# Patient Record
Sex: MALE | Race: WHITE | NOT HISPANIC OR LATINO | Employment: FULL TIME | URBAN - METROPOLITAN AREA
[De-identification: names, ages, dates, MRNs, and addresses within clinical notes are randomized per-mention and may not be internally consistent; named-entity substitution may affect disease eponyms.]

---

## 2018-07-09 ENCOUNTER — APPOINTMENT (OUTPATIENT)
Dept: RADIOLOGY | Facility: MEDICAL CENTER | Age: 58
End: 2018-07-09
Payer: OTHER MISCELLANEOUS

## 2018-07-09 ENCOUNTER — HOSPITAL ENCOUNTER (EMERGENCY)
Facility: MEDICAL CENTER | Age: 58
End: 2018-07-09
Attending: EMERGENCY MEDICINE
Payer: OTHER MISCELLANEOUS

## 2018-07-09 VITALS
HEART RATE: 76 BPM | SYSTOLIC BLOOD PRESSURE: 136 MMHG | BODY MASS INDEX: 32.14 KG/M2 | TEMPERATURE: 98.6 F | OXYGEN SATURATION: 97 % | RESPIRATION RATE: 20 BRPM | HEIGHT: 66 IN | DIASTOLIC BLOOD PRESSURE: 78 MMHG | WEIGHT: 200 LBS

## 2018-07-09 DIAGNOSIS — R20.2 LEFT LEG PARESTHESIAS: ICD-10-CM

## 2018-07-09 DIAGNOSIS — R79.89 ELEVATED SERUM CREATININE: ICD-10-CM

## 2018-07-09 DIAGNOSIS — R79.89 ELEVATED TROPONIN: ICD-10-CM

## 2018-07-09 LAB
ALBUMIN SERPL BCP-MCNC: 4.2 G/DL (ref 3.2–4.9)
ALBUMIN/GLOB SERPL: 1.3 G/DL
ALP SERPL-CCNC: 99 U/L (ref 30–99)
ALT SERPL-CCNC: 43 U/L (ref 2–50)
ANION GAP SERPL CALC-SCNC: 14 MMOL/L (ref 0–11.9)
AST SERPL-CCNC: 30 U/L (ref 12–45)
BASOPHILS # BLD AUTO: 0.5 % (ref 0–1.8)
BASOPHILS # BLD: 0.09 K/UL (ref 0–0.12)
BILIRUB SERPL-MCNC: 1.2 MG/DL (ref 0.1–1.5)
BUN SERPL-MCNC: 21 MG/DL (ref 8–22)
CALCIUM SERPL-MCNC: 9.9 MG/DL (ref 8.5–10.5)
CHLORIDE SERPL-SCNC: 101 MMOL/L (ref 96–112)
CK MB SERPL-MCNC: 8.9 NG/ML (ref 0–5)
CO2 SERPL-SCNC: 21 MMOL/L (ref 20–33)
CREAT SERPL-MCNC: 2.15 MG/DL (ref 0.5–1.4)
EKG IMPRESSION: NORMAL
EOSINOPHIL # BLD AUTO: 0.03 K/UL (ref 0–0.51)
EOSINOPHIL NFR BLD: 0.2 % (ref 0–6.9)
ERYTHROCYTE [DISTWIDTH] IN BLOOD BY AUTOMATED COUNT: 40.3 FL (ref 35.9–50)
GLOBULIN SER CALC-MCNC: 3.3 G/DL (ref 1.9–3.5)
GLUCOSE SERPL-MCNC: 110 MG/DL (ref 65–99)
HCT VFR BLD AUTO: 42.9 % (ref 42–52)
HGB BLD-MCNC: 14.7 G/DL (ref 14–18)
IMM GRANULOCYTES # BLD AUTO: 0.13 K/UL (ref 0–0.11)
IMM GRANULOCYTES NFR BLD AUTO: 0.7 % (ref 0–0.9)
LYMPHOCYTES # BLD AUTO: 1.13 K/UL (ref 1–4.8)
LYMPHOCYTES NFR BLD: 6.1 % (ref 22–41)
MCH RBC QN AUTO: 30.7 PG (ref 27–33)
MCHC RBC AUTO-ENTMCNC: 34.3 G/DL (ref 33.7–35.3)
MCV RBC AUTO: 89.6 FL (ref 81.4–97.8)
MONOCYTES # BLD AUTO: 1.49 K/UL (ref 0–0.85)
MONOCYTES NFR BLD AUTO: 8 % (ref 0–13.4)
NEUTROPHILS # BLD AUTO: 15.69 K/UL (ref 1.82–7.42)
NEUTROPHILS NFR BLD: 84.5 % (ref 44–72)
NRBC # BLD AUTO: 0 K/UL
NRBC BLD-RTO: 0 /100 WBC
PLATELET # BLD AUTO: 290 K/UL (ref 164–446)
PMV BLD AUTO: 9.4 FL (ref 9–12.9)
POTASSIUM SERPL-SCNC: 4.3 MMOL/L (ref 3.6–5.5)
PROT SERPL-MCNC: 7.5 G/DL (ref 6–8.2)
RBC # BLD AUTO: 4.79 M/UL (ref 4.7–6.1)
SODIUM SERPL-SCNC: 136 MMOL/L (ref 135–145)
TROPONIN I SERPL-MCNC: 0.05 NG/ML (ref 0–0.04)
WBC # BLD AUTO: 18.6 K/UL (ref 4.8–10.8)

## 2018-07-09 PROCEDURE — 36415 COLL VENOUS BLD VENIPUNCTURE: CPT

## 2018-07-09 PROCEDURE — 99284 EMERGENCY DEPT VISIT MOD MDM: CPT

## 2018-07-09 PROCEDURE — 71046 X-RAY EXAM CHEST 2 VIEWS: CPT

## 2018-07-09 PROCEDURE — 80053 COMPREHEN METABOLIC PANEL: CPT

## 2018-07-09 PROCEDURE — 82553 CREATINE MB FRACTION: CPT

## 2018-07-09 PROCEDURE — 85025 COMPLETE CBC W/AUTO DIFF WBC: CPT

## 2018-07-09 PROCEDURE — 84484 ASSAY OF TROPONIN QUANT: CPT

## 2018-07-09 PROCEDURE — 93005 ELECTROCARDIOGRAM TRACING: CPT | Performed by: EMERGENCY MEDICINE

## 2018-07-09 ASSESSMENT — PAIN SCALES - GENERAL
PAINLEVEL_OUTOF10: 2
PAINLEVEL_OUTOF10: 0

## 2018-07-09 ASSESSMENT — PAIN SCALES - WONG BAKER: WONGBAKER_NUMERICALRESPONSE: HURTS JUST A LITTLE BIT

## 2018-07-09 NOTE — LETTER
"  FORM C-4:  EMPLOYEE’S CLAIM FOR COMPENSATION/ REPORT OF INITIAL TREATMENT  EMPLOYEE’S CLAIM - PROVIDE ALL INFORMATION REQUESTED   First Name  Tomer Last Name  Bernard Birthdate             Age  1960 58 y.o. Sex  male Claim Number   Home Employee Address  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 Be specific and answer in detail. Use additional sheet if necessary)  working way too hard in 100 degree weather   If you believe that you have an occupational disease, when did you first have knowledge of the disability and it relationship to your employment?   Witnesses to the Accident  Unknown     Nature of Injury or Occupational Disease  Workers' Compensation  Part(s) of Body Injured or Affected  Lower Leg (L), N/A, N/A    I certify that the above is true and correct to the best of my knowledge and that I have provided this information in order to obtain the benefits of [place] 617 [place]  I hereby " authorize any physician, chiropractor, surgeon, practitioner, or other person, any hospital, including [facility], any medical service organization, any insurance company, or other institution or organization to release to each other, any medical or other information, including benefits paid or payable, pertinent to this injury or disease, except information relative to diagnosis, treatment and/or counseling for AIDS, psychological conditions, alcohol or controlled substances, for which I must give specific authorization.  A Photostat of this authorization shall be as valid as the original.   Date Place   Employee’s Signature   THIS REPORT MUST BE COMPLETED AND MAILED WITHIN 3 WORKING DAYS OF TREATMENT   Place  [facility], EMERGENCY DEPT  Name of Facility   [facility]   Date  7/9/2018 Diagnosis  (R20.2) Left leg paresthesias  (R74.8) Elevated troponin  (R79.89) Elevated serum creatinine Is there evidence the injured employee was under the influence of alcohol and/or another controlled substance at the time of accident?   Hour  3:31 PM Description of Injury or Disease  Left leg paresthesias  Elevated troponin  Elevated serum creatinine     Treatment     Have you advised the patient to remain off work five days or more?             X-Ray Findings      If Yes   From Date    To Date      From information given by the employee, together with medical evidence, can you directly connect this injury or occupational disease as job incurred?    If No, is the employee capable of: Full Duty    Modified Duty      Is additional medical care by a physician indicated?    If Modified Duty, Specify any Limitations / Restrictions        Do you know of any previous injury or disease contributing to this condition or occupational disease?      Date  7/12/2018 Print Doctor’s Name  Roque Strauss I certify the employer’s copy of this form was mailed on:  "  Address  [address] [place] [id number] [phone] Insurer’s Use Only   [facility]  [address]    Provider’s Tax ID Number  [id number] Telephone  Dept: [phone]    Doctor’s Signature    Degree       Original - TREATING PHYSICIAN OR CHIROPRACTOR   Pg 2-Insurer/TPA   Pg 3-Employer   Pg 4-Employee                                                                                                  Form C-4 (rev01/03)     BRIEF DESCRIPTION OF RIGHTS AND BENEFITS  (Pursuant to [place] 616C.050)    Notice of Injury or Occupational Disease (Incident Report Form C-1): If an injury or occupational disease (OD) arises out of and in the course of employment, you must provide written notice to your employer as soon as practicable, but no later than 7 days after the accident or OD. Your employer shall maintain a sufficient supply of the required forms.    Claim for Compensation (Form C-4): If medical treatment is sought, the form C-4 is available at the place of initial treatment. A completed \"Claim for Compensation\" (Form C-4) must be filed within 90 days after an accident or OD. The treating physician or chiropractor must, within 3 working days after treatment, complete and mail to the employer, the employer's insurer and third-party , the Claim for Compensation.    Medical Treatment: If you require medical treatment for your on-the-job injury or OD, you may be required to select a physician or chiropractor from a list provided by your workers’ compensation insurer, if it has contracted with an Organization for Managed Care (MCO) or Preferred Provider Organization (PPO) or providers of health care. If your employer has not entered into a contract with an MCO or PPO, you may select a physician or chiropractor from the Panel of Physicians and Chiropractors. Any medical costs related to your industrial injury or OD will be paid by your insurer.    Temporary Total Disability (TTD): If your " doctor has certified that you are unable to work for a period of at least 5 consecutive days, or 5 cumulative days in a 20-day period, or places restrictions on you that your employer does not accommodate, you may be entitled to TTD compensation.    Temporary Partial Disability (TPD): If the wage you receive upon reemployment is less than the compensation for TTD to which you are entitled, the insurer may be required to pay you TPD compensation to make up the difference. TPD can only be paid for a maximum of 24 months.    Permanent Partial Disability (PPD): When your medical condition is stable and there is an indication of a PPD as a result of your injury or OD, within 30 days, your insurer must arrange for an evaluation by a rating physician or chiropractor to determine the degree of your PPD. The amount of your PPD award depends on the date of injury, the results of the PPD evaluation and your age and wage.    Permanent Total Disability (PTD): If you are medically certified by a treating physician or chiropractor as permanently and totally disabled and have been granted a PTD status by your insurer, you are entitled to receive monthly benefits not to exceed 66 2/3% of your average monthly wage. The amount of your PTD payments is subject to reduction if you previously received a PPD award.    Vocational Rehabilitation Services: You may be eligible for vocational rehabilitation services if you are unable to return to the job due to a permanent physical impairment or permanent restrictions as a result of your injury or occupational disease.    Transportation and Per Lisette Reimbursement: You may be eligible for travel expenses and per lisette associated with medical treatment.  Reopening: You may be able to reopen your claim if your condition worsens after claim closure.    Appeal Process: If you disagree with a written determination issued by the insurer or the insurer does not respond to your request, you may appeal to  the Department of Administration, , by following the instructions contained in your determination letter. You must appeal the determination within 70 days from the date of the determination letter at [address], [address], [place], [place] [address], or [address], [address], [place], [place] [address]. If you disagree with the  decision, you may appeal to the Department of Administration, . You must file your appeal within 30 days from the date of the  decision letter at [address], [address], [place], [place] [address], or [address], [address], [place], [place] [address]. If you disagree with a decision of an , you may file a petition for judicial review with the District Court. You must do so within 30 days of the Appeal Officer’s decision. You may be represented by an  at your own expense or you may contact the [facility] for possible representation.    [place]  for Injured Workers (NAIW): If you disagree with a  decision, you may request that NAIW represent you without charge at an  Hearing. For information regarding denial of benefits, you may contact the [facility] at: [address], [address], [place] [address], [phone], or [address], [place] [address], [phone]    To File a Complaint with the Division: If you wish to file a complaint with the  of the Division of Industrial Relations (DIR), please contact the Workers’ Compensation Section, [address], [address], [place], [place] [address], telephone [phone], or [address], [address], [place] [address], telephone [phone].    For assistance with Workers’ Compensation Issues: you may contact the Office of the Governor Consumer Health Assistance, [address], [address], [place], [place] [address],  Toll Free 1-364.134.8049, Web site: http://pankaj..nv., E-mail josé@pankaj..nv.                                                                                                                                                                               __________________________________________________________________                                    _________________            Employee Name / Signature                                                                                                                            Date                                       D-2 (rev. 10/07)

## 2018-07-09 NOTE — LETTER
"  FORM C-4:  EMPLOYEE’S CLAIM FOR COMPENSATION/ REPORT OF INITIAL TREATMENT  EMPLOYEE’S CLAIM - PROVIDE ALL INFORMATION REQUESTED   First Name  Tomer Last Name  Bernard Birthdate             Age  1960 58 y.o. Sex  male Claim Number   Home Employee Address  [address]   Height  1.676 m (5' 6\") Weight  90.7 kg (200 lb) [facility]  [id number]   Mailing Employee Address                           [address]                 Zip   Telephone  [phone] (home)  Primary Language Spoken  ENGLISH   Insurer  *** Third Party   VICENTE JAMESON Employee's Occupation (Job Title) When Injury or Occupational Disease Occurred     Employer's Name  [employer] Telephone  [phone]    Employer Address  [address] [address] [address]] Zip  [address]   Date of Injury  7/9/2018       Hour of Injury   Date Employer Notified   Last Day of Work after Injury or Occupational Disease   Supervisor to Whom Injury Reported     Address or Location of Accident (if applicable)     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 Be specific and answer in detail. Use additional sheet if necessary)  working way too hard in 100 degree weather   If you believe that you have an occupational disease, when did you first have knowledge of the disability and it relationship to your employment?   Witnesses to the Accident  Unknown     Nature of Injury or Occupational Disease  Workers' Compensation  Part(s) of Body Injured or Affected  Lower Leg (L), N/A, N/A    I certify that the above is true and correct to the best of my knowledge and that I have provided this information in order to obtain the benefits of [place]’s [place] 616[place], inclusive or Chapter 617 of [place]).  I hereby authorize any physician, " chiropractor, surgeon, practitioner, or other person, any hospital, including [facility] hospital, any medical service organization, any insurance company, or other institution or organization to release to each other, any medical or other information, including benefits paid or payable, pertinent to this injury or disease, except information relative to diagnosis, treatment and/or counseling for AIDS, psychological conditions, alcohol or controlled substances, for which I must give specific authorization.  A Photostat of this authorization shall be as valid as the original.   Date Place   Employee’s Signature   THIS REPORT MUST BE COMPLETED AND MAILED WITHIN 3 WORKING DAYS OF TREATMENT   Place  [facility], EMERGENCY DEPT  Name of Facility   [facility]   Date  7/9/2018 Diagnosis  (R20.2) Left leg paresthesias  (R74.8) Elevated troponin  (R79.89) Elevated serum creatinine Is there evidence the injured employee was under the influence of alcohol and/or another controlled substance at the time of accident?   Hour  3:24 PM Description of Injury or Disease  Left leg paresthesias  Elevated troponin  Elevated serum creatinine     Treatment     Have you advised the patient to remain off work five days or more?             X-Ray Findings      If Yes   From Date    To Date      From information given by the employee, together with medical evidence, can you directly connect this injury or occupational disease as job incurred?    If No, is the employee capable of: Full Duty    Modified Duty      Is additional medical care by a physician indicated?    If Modified Duty, Specify any Limitations / Restrictions        Do you know of any previous injury or disease contributing to this condition or occupational disease?      Date  7/12/2018 Print Doctor’s Name  Roque Strauss I certify the employer’s copy of this form was mailed on:   Address  [address]  "[place] [address]  [phone] Insurer’s Use Only   [facility]  [address]    Provider’s Tax ID Number  [id number] Telephone  Dept: [phone]    Doctor’s Signature    Degree       Original - TREATING PHYSICIAN OR CHIROPRACTOR   Pg 2-Insurer/TPA   Pg 3-Employer   Pg 4-Employee                                                                                                  Form C-4 (rev01/03)     BRIEF DESCRIPTION OF RIGHTS AND BENEFITS  ([place]ursuant to [place]RS 616C.050)    Notice of Injury or Occupational Disease (Incident Report Form C-1): If an injury or occupational disease (OD) arises out of and in the course of employment, you must provide written notice to your employer as soon as practicable, but no later than 7 days after the accident or OD. Your employer shall maintain a sufficient supply of the required forms.    Claim for Compensation (Form C-4): If medical treatment is sought, the form C-4 is available at the place of initial treatment. A completed \"Claim for Compensation\" (Form C-4) must be filed within 90 days after an accident or OD. The treating physician or chiropractor must, within 3 working days after treatment, complete and mail to the employer, the employer's insurer and third-party , the Claim for Compensation.    Medical Treatment: If you require medical treatment for your on-the-job injury or OD, you may be required to select a physician or chiropractor from a list provided by your workers’ compensation insurer, if it has contracted with an Organization for Managed Care (MCO) or Preferred Provider Organization (PPO) or providers of health care. If your employer has not entered into a contract with an MCO or PPO, you may select a physician or chiropractor from the Panel of Physicians and Chiropractors. Any medical costs related to your industrial injury or OD will be paid by your insurer.    Temporary Total Disability (TTD): If your doctor has certified that you are " unable to work for a period of at least 5 consecutive days, or 5 cumulative days in a 20-day period, or places restrictions on you that your employer does not accommodate, you may be entitled to TTD compensation.    Temporary Partial Disability (TPD): If the wage you receive upon reemployment is less than the compensation for TTD to which you are entitled, the insurer may be required to pay you TPD compensation to make up the difference. TPD can only be paid for a maximum of 24 months.    Permanent Partial Disability (PPD): When your medical condition is stable and there is an indication of a PPD as a result of your injury or OD, within 30 days, your insurer must arrange for an evaluation by a rating physician or chiropractor to determine the degree of your PPD. The amount of your PPD award depends on the date of injury, the results of the PPD evaluation and your age and wage.    Permanent Total Disability (PTD): If you are medically certified by a treating physician or chiropractor as permanently and totally disabled and have been granted a PTD status by your insurer, you are entitled to receive monthly benefits not to exceed 66 2/3% of your average monthly wage. The amount of your PTD payments is subject to reduction if you previously received a PPD award.    Vocational Rehabilitation Services: You may be eligible for vocational rehabilitation services if you are unable to return to the job due to a permanent physical impairment or permanent restrictions as a result of your injury or occupational disease.    Transportation and Per Lisette Reimbursement: You may be eligible for travel expenses and per lisette associated with medical treatment.  Reopening: You may be able to reopen your claim if your condition worsens after claim closure.    Appeal Process: If you disagree with a written determination issued by the insurer or the insurer does not respond to your request, you may appeal to the Department of Administration,  , by following the instructions contained in your determination letter. You must appeal the determination within 70 days from the date of the determination letter at [address], [address], [place], [place] [address], or [address], [place], [place] [address]. If you disagree with the  decision, you may appeal to the Department of Administration, . You must file your appeal within 30 days from the date of the  decision letter at [address], [address], [place], [place] [address], or [address], [address], [place], [place] [address]. If you disagree with a decision of an , you may file a petition for judicial review with the District Court. You must do so within 30 days of the Appeal Officer’s decision. You may be represented by an  at your own expense or you may contact the [facility] for possible representation.    [place]  for Injured Workers ([place]IW): If you disagree with a  decision, you may request that NAIW represent you without charge at an  Hearing. For information regarding denial of benefits, you may contact the [facility] at: [address], [address], [place] [address], [phone], or [address], [place] [address], [phone]    To File a Complaint with the Division: If you wish to file a complaint with the  of the Division of Industrial Relations (DIR), please contact the Workers’ Compensation Section, [address], [address], [place], [place] [address], telephone [phone], or [address], [place] [address], telephone [phone].    For assistance with Workers’ Compensation Issues: you may contact the Office of the Governor Consumer Health Assistance, [address], [address], [place], [place] [address], Toll Free 1-449.349.2658, Web  site: http://pankaj..nv.us, E-mail josé@.nv.                                                                                                                                                                               __________________________________________________________________                                    _________________            Employee Name / Signature                                                                                                                            Date                                       D-2 (rev. 10/07)

## 2018-07-09 NOTE — LETTER
"  FORM C-4:  EMPLOYEE’S CLAIM FOR COMPENSATION/ REPORT OF INITIAL TREATMENT  EMPLOYEE’S CLAIM - PROVIDE ALL INFORMATION REQUESTED   First Name  Tomer Last Name  Bernard Birthdate             Age  1960 58 y.o. 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 Be specific and answer in detail. Use additional sheet if necessary)  working way too hard in 100 degree weather   If you believe that you have an occupational disease, when did you first have knowledge of the disability and it relationship to your employment?  n/a Witnesses to the Accident       Nature of Injury or Occupational Disease  Pain Part(s) of Body Injured or Affected  Lower Leg (L), N/A, N/A    I certify that the above is true and correct to the best of my knowledge and that I have provided this information in order to obtain the benefits of [place] " [place] inclusive or Chapter 617 of [place]).  I hereby authorize any physician, chiropractor, surgeon, practitioner, or other person, any hospital, including [facility] or [facility] hospital, any medical service organization, any insurance company, or other institution or organization to release to each other, any medical or other information, including benefits paid or payable, pertinent to this injury or disease, except information relative to diagnosis, treatment and/or counseling for AIDS, psychological conditions, alcohol or controlled substances, for which I must give specific authorization.  A Photostat of this authorization shall be as valid as the original.   Date Place  [facility] ED Employee’s Signature   THIS REPORT MUST BE COMPLETED AND MAILED WITHIN 3 WORKING DAYS OF TREATMENT   Place  [facility], EMERGENCY DEPT  Name of Facility   [facility]   Date  7/9/2018 Diagnosis  (R20.2) Left leg paresthesias  (R74.8) Elevated troponin  (R79.89) Elevated serum creatinine Is there evidence the injured employee was under the influence of alcohol and/or another controlled substance at the time of accident?   Hour  7:25 AM Description of Injury or Disease  Left leg paresthesias  Elevated troponin  Elevated serum creatinine     Treatment     Have you advised the patient to remain off work five days or more?             X-Ray Findings      If Yes   From Date    To Date      From information given by the employee, together with medical evidence, can you directly connect this injury or occupational disease as job incurred?    If No, is the employee capable of: Full Duty    Modified Duty      Is additional medical care by a physician indicated?    If Modified Duty, Specify any Limitations / Restrictions        Do you know of any previous injury or disease contributing to this condition or occupational disease?      Date  7/13/2018 Print Doctor’s Name  Roque Strauss  "P I certify the employer’s copy of this form was mailed on: MD   Address  [address] [address] [phone] Insurer’s Use Only   [facility]  [id number]    Provider’s Tax ID Number  [id number] Telephone  Dept: [phone]    Doctor’s Signature    Degree       Original - TREATING PHYSICIAN OR CHIROPRACTOR   Pg 2-Insurer/TPA   Pg 3-Employer   Pg 4-Employee                                                                                                  Form C-4 (rev01/03)     BRIEF DESCRIPTION OF RIGHTS AND BENEFITS  (Pursuant to [place]RS 616C.050)    Notice of Injury or Occupational Disease (Incident Report Form C-1): If an injury or occupational disease (OD) arises out of and in the course of employment, you must provide written notice to your employer as soon as practicable, but no later than 7 days after the accident or OD. Your employer shall maintain a sufficient supply of the required forms.    Claim for Compensation (Form C-4): If medical treatment is sought, the form C-4 is available at the place of initial treatment. A completed \"Claim for Compensation\" (Form C-4) must be filed within 90 days after an accident or OD. The treating physician or chiropractor must, within 3 working days after treatment, complete and mail to the employer, the employer's insurer and third-party , the Claim for Compensation.    Medical Treatment: If you require medical treatment for your on-the-job injury or OD, you may be required to select a physician or chiropractor from a list provided by your workers’ compensation insurer, if it has contracted with an Organization for Managed Care (MCO) or Preferred Provider Organization (PPO) or providers of health care. If your employer has not entered into a contract with an MCO or PPO, you may select a physician or chiropractor from the Panel of Physicians and Chiropractors. Any medical costs related to your industrial injury or OD will be paid by " your insurer.    Temporary Total Disability (TTD): If your doctor has certified that you are unable to work for a period of at least 5 consecutive days, or 5 cumulative days in a 20-day period, or places restrictions on you that your employer does not accommodate, you may be entitled to TTD compensation.    Temporary Partial Disability (TPD): If the wage you receive upon reemployment is less than the compensation for TTD to which you are entitled, the insurer may be required to pay you TPD compensation to make up the difference. TPD can only be paid for a maximum of 24 months.    Permanent Partial Disability (PPD): When your medical condition is stable and there is an indication of a PPD as a result of your injury or OD, within 30 days, your insurer must arrange for an evaluation by a rating physician or chiropractor to determine the degree of your PPD. The amount of your PPD award depends on the date of injury, the results of the PPD evaluation and your age and wage.    Permanent Total Disability (PTD): If you are medically certified by a treating physician or chiropractor as permanently and totally disabled and have been granted a PTD status by your insurer, you are entitled to receive monthly benefits not to exceed 66 2/3% of your average monthly wage. The amount of your PTD payments is subject to reduction if you previously received a PPD award.    Vocational Rehabilitation Services: You may be eligible for vocational rehabilitation services if you are unable to return to the job due to a permanent physical impairment or permanent restrictions as a result of your injury or occupational disease.    Transportation and Per Lisette Reimbursement: You may be eligible for travel expenses and per lisette associated with medical treatment.  Reopening: You may be able to reopen your claim if your condition worsens after claim closure.    Appeal Process: If you disagree with a written determination issued by the insurer or the  insurer does not respond to your request, you may appeal to the Department of Administration, , by following the instructions contained in your determination letter. You must appeal the determination within 70 days from the date of the determination letter at [address], [address], [place], [place] [address], or [address], [address], [place], [place] [address]. If you disagree with the  decision, you may appeal to the Department of Administration, . You must file your appeal within 30 days from the date of the  decision letter at [address], [address], [place], [place] [address], or [address], [address], [place], [place] [address]. If you disagree with a decision of an , you may file a petition for judicial review with the District Court. You must do so within 30 days of the Appeal Officer’s decision. You may be represented by an  at your own expense or you may contact the [facility] for possible representation.    [place]  for Injured Workers (NAIW): If you disagree with a  decision, you may request that NAIW represent you without charge at an  Hearing. For information regarding denial of benefits, you may contact the [facility] at: [address], [address], [place] [address], [phone], or [address], [place] [address], [phone]    To File a Complaint with the Division: If you wish to file a complaint with the  of the Division of Industrial Relations (DIR), please contact the Workers’ Compensation Section, [address], [address], [place], [place] [address], telephone [phone], or [address], [place] [address], telephone [phone].    For assistance with Workers’ Compensation Issues: you may contact the Office of the [facility] Consumer Health Assistance, [address], [address], [place], [place] [address], Toll Free 1-291.882.2489, Web site: http://pankaj.[facility].nv., E-mail [email or web address].                                                                                                                                                                               __________________________________________________________________                                    _________________            Employee Name / Signature                                                                                                                            Date                                       D-2 (rev. 10/07)

## 2018-07-09 NOTE — LETTER
"  FORM C-4:  EMPLOYEE’S CLAIM FOR COMPENSATION/ REPORT OF INITIAL TREATMENT  EMPLOYEE’S CLAIM - PROVIDE ALL INFORMATION REQUESTED   First Name  Tomer Last Name  Bernard Birthdate             Age  1960 58 y.o. 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 Be specific and answer in detail. Use additional sheet if necessary)  working way too hard in 100 degree weather   If you believe that you have an occupational disease, when did you first have knowledge of the disability and it relationship to your employment?  n/a Witnesses to the Accident       Nature of Injury or Occupational Disease  Workers' Compensation  Part(s) of Body Injured or Affected  Lower Leg (L), N/A, N/A    I certify that the above is true and correct to the best of my knowledge and that I have provided this information in order to obtain the benefits of [place]’s [place] and [place]RS " 616A to 616D, inclusive or Chapter 617 of [place]RS).  I hereby authorize any physician, chiropractor, surgeon, practitioner, or other person, any hospital, including [facility] or [facility] hospital, any medical service organization, any insurance company, or other institution or organization to release to each other, any medical or other information, including benefits paid or payable, pertinent to this injury or disease, except information relative to diagnosis, treatment and/or counseling for AIDS, psychological conditions, alcohol or controlled substances, for which I must give specific authorization.  A Photostat of this authorization shall be as valid as the original.   Date Place  [facility] Employee’s Signature   THIS REPORT MUST BE COMPLETED AND MAILED WITHIN 3 WORKING DAYS OF TREATMENT   Place  [facility], EMERGENCY DEPT  Name of Facility   [facility]   Date  7/9/2018 Diagnosis  (R20.2) Left leg paresthesias  (R74.8) Elevated troponin  (R79.89) Elevated serum creatinine Is there evidence the injured employee was under the influence of alcohol and/or another controlled substance at the time of accident?   Hour  3:48 PM Description of Injury or Disease  Left leg paresthesias  Elevated troponin  Elevated serum creatinine     Treatment     Have you advised the patient to remain off work five days or more?             X-Ray Findings      If Yes   From Date    To Date      From information given by the employee, together with medical evidence, can you directly connect this injury or occupational disease as job incurred?    If No, is the employee capable of: Full Duty    Modified Duty      Is additional medical care by a physician indicated?    If Modified Duty, Specify any Limitations / Restrictions        Do you know of any previous injury or disease contributing to this condition or occupational disease?      Date  7/12/2018 Print Doctor’s  "Name  Roque Strauss I certify the employer’s copy of this form was mailed on:   Address  [address] [address] [phone] Insurer’s Use Only   [facility]  [id number]    Provider’s Tax ID Number  [id number] Telephone  Dept: [phone]    Doctor’s Signature    Degree   MD    Original - TREATING PHYSICIAN OR CHIROPRACTOR   Pg 2-Insurer/TPA   Pg 3-Employer   Pg 4-Employee                                                                                                  Form C-4 (rev01/03)     BRIEF DESCRIPTION OF RIGHTS AND BENEFITS  ([place]ursuant to [place]RS 616C.050)    Notice of Injury or Occupational Disease (Incident Report Form C-1): If an injury or occupational disease (OD) arises out of and in the course of employment, you must provide written notice to your employer as soon as practicable, but no later than 7 days after the accident or OD. Your employer shall maintain a sufficient supply of the required forms.    Claim for Compensation (Form C-4): If medical treatment is sought, the form C-4 is available at the place of initial treatment. A completed \"Claim for Compensation\" (Form C-4) must be filed within 90 days after an accident or OD. The treating physician or chiropractor must, within 3 working days after treatment, complete and mail to the employer, the employer's insurer and third-party , the Claim for Compensation.    Medical Treatment: If you require medical treatment for your on-the-job injury or OD, you may be required to select a physician or chiropractor from a list provided by your workers’ compensation insurer, if it has contracted with an Organization for Managed Care (MCO) or Preferred Provider Organization (PPO) or providers of health care. If your employer has not entered into a contract with an MCO or PPO, you may select a physician or chiropractor from the Panel of Physicians and Chiropractors. Any medical costs related to your industrial injury or " OD will be paid by your insurer.    Temporary Total Disability (TTD): If your doctor has certified that you are unable to work for a period of at least 5 consecutive days, or 5 cumulative days in a 20-day period, or places restrictions on you that your employer does not accommodate, you may be entitled to TTD compensation.    Temporary Partial Disability (TPD): If the wage you receive upon reemployment is less than the compensation for TTD to which you are entitled, the insurer may be required to pay you TPD compensation to make up the difference. TPD can only be paid for a maximum of 24 months.    Permanent Partial Disability (PPD): When your medical condition is stable and there is an indication of a PPD as a result of your injury or OD, within 30 days, your insurer must arrange for an evaluation by a rating physician or chiropractor to determine the degree of your PPD. The amount of your PPD award depends on the date of injury, the results of the PPD evaluation and your age and wage.    Permanent Total Disability (PTD): If you are medically certified by a treating physician or chiropractor as permanently and totally disabled and have been granted a PTD status by your insurer, you are entitled to receive monthly benefits not to exceed 66 2/3% of your average monthly wage. The amount of your PTD payments is subject to reduction if you previously received a PPD award.    Vocational Rehabilitation Services: You may be eligible for vocational rehabilitation services if you are unable to return to the job due to a permanent physical impairment or permanent restrictions as a result of your injury or occupational disease.    Transportation and Per Lisette Reimbursement: You may be eligible for travel expenses and per lisette associated with medical treatment.  Reopening: You may be able to reopen your claim if your condition worsens after claim closure.    Appeal Process: If you disagree with a written determination issued by  the insurer or the insurer does not respond to your request, you may appeal to the Department of Administration, , by following the instructions contained in your determination letter. You must appeal the determination within 70 days from the date of the determination letter at [address], [address], [place], [place] [address], or [address], [place], [place] [address]. If you disagree with the  decision, you may appeal to the Department of Administration, . You must file your appeal within 30 days from the date of the  decision letter at [address], [address], [place], [place] [address], or [address], [place], [place] [address]. If you disagree with a decision of an , you may file a petition for judicial review with the District Court. You must do so within 30 days of the Appeal Officer’s decision. You may be represented by an  at your own expense or you may contact the [facility] for possible representation.    [place]  for Injured Workers ([place]IW): If you disagree with a  decision, you may request that NAIW represent you without charge at an  Hearing. For information regarding denial of benefits, you may contact the [facility] at: [address], [address], [place] [address], [phone], or [address], [place] [address], [phone]    To File a Complaint with the Division: If you wish to file a complaint with the  of the Division of Industrial Relations (DIR), please contact the Workers’ Compensation Section, [address], [place], [place] [address], telephone ([phone]) [phone], or [address], [place] [address], telephone [phone].    For assistance with Workers’ Compensation Issues: you may contact the Office of the Governor Consumer  Health Assistance, [address], [address], [place], [place] [address], Toll Free 1-473.989.8496, Web site: http://govcha.[facility].nv.us, E-mail [email or web address].                                                                                                                                                                               __________________________________________________________________                                    _________________            Employee Name / Signature                                                                                                                            Date                                       D-2 (rev. 10/07)

## 2018-07-09 NOTE — LETTER
"  FORM C-4:  EMPLOYEE’S CLAIM FOR COMPENSATION/ REPORT OF INITIAL TREATMENT  EMPLOYEE’S CLAIM - PROVIDE ALL INFORMATION REQUESTED   First Name  Tomer Last Name  Bernard Birthdate             Age  1960 58 y.o. 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 Be specific and answer in detail. Use additional sheet if necessary)  working way too hard in 100 degree weather   If you believe that you have an occupational disease, when did you first have knowledge of the disability and it relationship to your employment?  n/a Witnesses to the Accident       Nature of Injury or Occupational Disease  Pain Part(s) of Body Injured or Affected  Lower Leg (L), N/A, N/A    I certify that the above is true and correct to the best of my knowledge and that I have provided this information in order to obtain the benefits of [place] or [place]).  I hereby authorize any physician, chiropractor, surgeon, practitioner, or other person, any hospital, including [facility] hospital, any medical service organization, any insurance company, or other institution or organization to release to each other, any medical or other information, including benefits paid or payable, pertinent to this injury or disease, except information relative to diagnosis, treatment and/or counseling for AIDS, psychological conditions, alcohol or controlled substances, for which I must give specific authorization.  A Photostat of this authorization shall be as valid as the original.   Date Place  [facility] ED Employee’s Signature   THIS REPORT MUST BE COMPLETED AND MAILED WITHIN 3 WORKING DAYS OF TREATMENT   Place  [facility], EMERGENCY DEPT  Name of Facility   [facility]   Date  7/9/2018 Diagnosis  (R20.2) Left leg paresthesias  (R74.8) Elevated troponin  (R79.89) Elevated serum creatinine Is there evidence the injured employee was under the influence of alcohol and/or another controlled substance at the time of accident?   Hour  7:18 AM Description of Injury or Disease  Left leg paresthesias  Elevated troponin  Elevated serum creatinine     Treatment     Have you advised the patient to remain off work five days or more?             X-Ray Findings      If Yes   From Date    To Date      From information given by the employee, together with medical evidence, can you directly connect this injury or occupational disease as job incurred?    If No, is the employee capable of: Full Duty    Modified Duty      Is additional medical care by a physician indicated?    If Modified Duty, Specify any Limitations / Restrictions        Do you know of any previous injury or disease contributing to this condition or occupational disease?      Date  7/13/2018 Print Doctor’s Name  Roque Strauss  "P I certify the employer’s copy of this form was mailed on:   Address  [address]  [place] [address] [phone] Insurer’s Use Only   [facility]  [id number]    Provider’s Tax ID Number  [id number] Telephone  Dept: [phone]    Doctor’s Signature    Degree   MD    Original - TREATING PHYSICIAN OR CHIROPRACTOR   Pg 2-Insurer/TPA   Pg 3-Employer   Pg 4-Employee                                                                                                  Form C-4 (rev01/03)     BRIEF DESCRIPTION OF RIGHTS AND BENEFITS  (Pursuant to [place] 616C.[place])    Notice of Injury or Occupational Disease (Incident Report Form C-1): If an injury or occupational disease (OD) arises out of and in the course of employment, you must provide written notice to your employer as soon as practicable, but no later than 7 days after the accident or OD. Your employer shall maintain a sufficient supply of the required forms.    Claim for Compensation (Form C-4): If medical treatment is sought, the form C-4 is available at the place of initial treatment. A completed \"Claim for Compensation\" (Form C-4) must be filed within 90 days after an accident or OD. The treating physician or chiropractor must, within 3 working days after treatment, complete and mail to the employer, the employer's insurer and third-party , the Claim for Compensation.    Medical Treatment: If you require medical treatment for your on-the-job injury or OD, you may be required to select a physician or chiropractor from a list provided by your workers’ compensation insurer, if it has contracted with an Organization for Managed Care (MCO) or Preferred Provider Organization (PPO) or providers of health care. If your employer has not entered into a contract with an MCO or PPO, you may select a physician or chiropractor from the Panel of Physicians and Chiropractors. Any medical costs related to your industrial injury or OD will be paid by " your insurer.    Temporary Total Disability (TTD): If your doctor has certified that you are unable to work for a period of at least 5 consecutive days, or 5 cumulative days in a 20-day period, or places restrictions on you that your employer does not accommodate, you may be entitled to TTD compensation.    Temporary Partial Disability (TPD): If the wage you receive upon reemployment is less than the compensation for TTD to which you are entitled, the insurer may be required to pay you TPD compensation to make up the difference. TPD can only be paid for a maximum of 24 months.    Permanent Partial Disability (PPD): When your medical condition is stable and there is an indication of a PPD as a result of your injury or OD, within 30 days, your insurer must arrange for an evaluation by a rating physician or chiropractor to determine the degree of your PPD. The amount of your PPD award depends on the date of injury, the results of the PPD evaluation and your age and wage.    Permanent Total Disability (PTD): If you are medically certified by a treating physician or chiropractor as permanently and totally disabled and have been granted a PTD status by your insurer, you are entitled to receive monthly benefits not to exceed 66 2/3% of your average monthly wage. The amount of your PTD payments is subject to reduction if you previously received a PPD award.    Vocational Rehabilitation Services: You may be eligible for vocational rehabilitation services if you are unable to return to the job due to a permanent physical impairment or permanent restrictions as a result of your injury or occupational disease.    Transportation and Per Lisette Reimbursement: You may be eligible for travel expenses and per lisette associated with medical treatment.  Reopening: You may be able to reopen your claim if your condition worsens after claim closure.    Appeal Process: If you disagree with a written determination issued by the insurer or the  insurer does not respond to your request, you may appeal to the Department of Administration, , by following the instructions contained in your determination letter. You must appeal the determination within 70 days from the date of the determination letter at [address], [address], [place], [place] [address], or [address], [address], [place], [place] [address]. If you disagree with the  decision, you may appeal to the Department of Administration, . You must file your appeal within 30 days from the date of the  decision letter at [address], [address], [place], [place] [address], or [address], [address], [place], [place] [address]. If you disagree with a decision of an , you may file a petition for judicial review with the District Court. You must do so within 30 days of the Appeal Officer’s decision. You may be represented by an  at your own expense or you may contact the [facility] for possible representation.    [place]  for Injured Workers (NAIW): If you disagree with a  decision, you may request that NAIW represent you without charge at an  Hearing. For information regarding denial of benefits, you may contact the [facility] at: [address], [address], [place] [address], [phone], or [address], [address], [place] [address], [phone]    To File a Complaint with the Division: If you wish to file a complaint with the  of the Division of Industrial Relations (DIR), please contact the Workers’ Compensation Section, [address], [address], [place], [place] [address], telephone [phone], or [address], [place] [address], telephone [phone].    For assistance with Workers’ Compensation Issues: you may contact the Office of the [facility] Consumer Health Assistance, [address], [address], [place], [place] [address], Toll Free 1-353.605.7706, Web site: http://[email or web address]., E-mail [email or web address].                                                                                                                                                                               __________________________________________________________________                                    _________________            Employee Name / Signature                                                                                                                            Date                                       D-2 (rev. 10/07)

## 2018-07-10 NOTE — ED NOTES
PT IS LEAVING AMA. PT WAS TOLD OF RISKS. PT STATED UNDERSTANDING, PT WAS GIVEN D/C INSTRUCTIONS AND COPIES OF HE LABS AS PER MDS ORDERS.  PT HAS NO S/S'S AT THIS TIME. PT LEFT WOI. PT IS REFUSING CARE. PT WAS  TOLD OF RISKS.

## 2018-07-10 NOTE — DISCHARGE INSTRUCTIONS
Paresthesia  Introduction  Paresthesia is an abnormal burning or prickling sensation. This sensation is generally felt in the hands, arms, legs, or feet. However, it may occur in any part of the body. Usually, it is not painful. The feeling may be described as:  · Tingling or numbness.  · Pins and needles.  · Skin crawling.  · Buzzing.  · Limbs falling asleep.  · Itching.  Most people experience temporary (transient) paresthesia at some time in their lives. Paresthesia may occur when you breathe too quickly (hyperventilation). It can also occur without any apparent cause. Commonly, paresthesia occurs when pressure is placed on a nerve. The sensation quickly goes away after the pressure is removed. For some people, however, paresthesia is a long-lasting (chronic) condition that is caused by an underlying disorder. If you continue to have paresthesia, you may need further medical evaluation.  Follow these instructions at home:  Watch your condition for any changes. Taking the following actions may help to lessen any discomfort that you are feeling:  · Avoid drinking alcohol.  · Try acupuncture or massage to help relieve your symptoms.  · Keep all follow-up visits as directed by your health care provider. This is important.  Contact a health care provider if:  · You continue to have episodes of paresthesia.  · Your burning or prickling feeling gets worse when you walk.  · You have pain, cramps, or dizziness.  · You develop a rash.  Get help right away if:  · You feel weak.  · You have trouble walking or moving.  · You have problems with speech, understanding, or vision.  · You feel confused.  · You cannot control your bladder or bowel movements.  · You have numbness after an injury.  · You faint.  This information is not intended to replace advice given to you by your health care provider. Make sure you discuss any questions you have with your health care provider.  Document Released: 12/08/2003 Document Revised:  05/25/2017 Document Reviewed: 12/14/2015  © 2017 Elsevier

## 2018-07-10 NOTE — ED TRIAGE NOTES
Tomer Wagner 58 y.o. male who presents to ED from work via EMS for chief complaint of Leg Cramps (left hip and leg pain and numbness, sudden onset)    No history of this. Reports pain resolved on arrival to ED.  Received 500cc bolus via EMS. Now reports intermittent cramping over general body. 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 Reports he feels he has been drinking enough water.     Patient is alert and oriented x 4, respirations even and unlabored, no acute distress noted.   Stretcher low, wheels locked, call light within reach. Placed on continuous cardiac, blood pressure, and pulse oximetry monitoring.

## 2018-07-10 NOTE — ED PROVIDER NOTES
"ED Provider Note    Scribed for Roque Strauss M.D. by Dilshad Burrows. 7/9/2018, 7:10 PM.    Primary care provider: None noted  Means of arrival: EMS  History obtained from: Patient  History limited by: None    CHIEF COMPLAINT  Chief Complaint   Patient presents with   • Leg Cramps     left hip and leg pain and numbness, sudden onset       HPI  Tomer Wagner is a 58 y.o. male who presents to the Emergency Department complaining of left hip and leg cramping pain onset just prior to arrival. 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  He states he was \"pushing myself too hard today\" while in the outdoor heat. He also reports not eating anything all day today. By the time he got out of the truck, the leg felt paralyzed in which he was unable to feel his leg. He tried taking some Aleve. This episode lasted for approximately 1 hour. He states he is feeling fine at the time of exam. Patient denies any loss of consciousness, left arm pain, vision changes, headaches, nausea, vomiting, or chest pain. He takes medications for hypertension, cholesterol, and heartburn. He also notes taking aspirin. Patient states his cholesterol and blood pressure have been at normal levels lately.       REVIEW OF SYSTEMS  See HPI for further details. All other systems are negative.   C    PAST MEDICAL HISTORY   Hypertension, hypercholesterolemia, heartburn    SURGICAL HISTORY  patient denies any surgical history    SOCIAL HISTORY      FAMILY HISTORY  No pertinent family history reported.     CURRENT MEDICATIONS  Reviewed.  See Encounter Summary.     ALLERGIES  No Known Allergies    PHYSICAL EXAM  VITAL SIGNS: /66   Pulse 93   Temp 36.6 °C (97.9 °F)   Resp 16   Ht 1.676 m (5' 6\")   Wt 90.7 kg (200 lb)   SpO2 97%   BMI 32.28 kg/m²   Constitutional: Alert in no apparent distress.  HENT: No signs of " trauma, Bilateral external ears normal, Nose normal.   Eyes: Pupils are equal and reactive, Conjunctiva normal, Non-icteric.   Neck: Normal range of motion, No tenderness, Supple, No stridor.   Lymphatic: No lymphadenopathy noted.   Cardiovascular: Regular rate and rhythm, no murmurs.   Thorax & Lungs: Normal breath sounds, No respiratory distress, No wheezing, No chest tenderness.   Abdomen: Bowel sounds normal, Soft, No tenderness, No masses, No pulsatile masses. No peritoneal signs.  Skin: Warm, Dry, No erythema, No rash.   Back: No bony tenderness, No CVA tenderness.   Extremities: Intact distal pulses, No edema, No tenderness, No cyanosis  Musculoskeletal: Good range of motion in all major joints. No tenderness to palpation or major deformities noted.   Neurologic: Alert , Normal motor function, Normal sensory function, No focal deficits noted. Finger to nose normal, heel to shin normal, cerebellar intact, cranial nerves II-XII intact, strength 5/5 and equal bilaterally, sensation intact throughout, cooperative, appropriate    Psychiatric: Affect normal, Judgment normal, Mood normal.      DIAGNOSTIC STUDIES / PROCEDURES     LABS  Results for orders placed or performed during the hospital encounter of 07/09/18   CBC WITH DIFFERENTIAL   Result Value Ref Range    WBC 18.6 (H) 4.8 - 10.8 K/uL    RBC 4.79 4.70 - 6.10 M/uL    Hemoglobin 14.7 14.0 - 18.0 g/dL    Hematocrit 42.9 42.0 - 52.0 %    MCV 89.6 81.4 - 97.8 fL    MCH 30.7 27.0 - 33.0 pg    MCHC 34.3 33.7 - 35.3 g/dL    RDW 40.3 35.9 - 50.0 fL    Platelet Count 290 164 - 446 K/uL    MPV 9.4 9.0 - 12.9 fL    Neutrophils-Polys 84.50 (H) 44.00 - 72.00 %    Lymphocytes 6.10 (L) 22.00 - 41.00 %    Monocytes 8.00 0.00 - 13.40 %    Eosinophils 0.20 0.00 - 6.90 %    Basophils 0.50 0.00 - 1.80 %    Immature Granulocytes 0.70 0.00 - 0.90 %    Nucleated RBC 0.00 /100 WBC    Neutrophils (Absolute) 15.69 (H) 1.82 - 7.42 K/uL    Lymphs (Absolute) 1.13 1.00 - 4.80 K/uL     Monos (Absolute) 1.49 (H) 0.00 - 0.85 K/uL    Eos (Absolute) 0.03 0.00 - 0.51 K/uL    Baso (Absolute) 0.09 0.00 - 0.12 K/uL    Immature Granulocytes (abs) 0.13 (H) 0.00 - 0.11 K/uL    NRBC (Absolute) 0.00 K/uL   COMP METABOLIC PANEL   Result Value Ref Range    Sodium 136 135 - 145 mmol/L    Potassium 4.3 3.6 - 5.5 mmol/L    Chloride 101 96 - 112 mmol/L    Co2 21 20 - 33 mmol/L    Anion Gap 14.0 (H) 0.0 - 11.9    Glucose 110 (H) 65 - 99 mg/dL    Bun 21 8 - 22 mg/dL    Creatinine 2.15 (H) 0.50 - 1.40 mg/dL    Calcium 9.9 8.5 - 10.5 mg/dL    AST(SGOT) 30 12 - 45 U/L    ALT(SGPT) 43 2 - 50 U/L    Alkaline Phosphatase 99 30 - 99 U/L    Total Bilirubin 1.2 0.1 - 1.5 mg/dL    Albumin 4.2 3.2 - 4.9 g/dL    Total Protein 7.5 6.0 - 8.2 g/dL    Globulin 3.3 1.9 - 3.5 g/dL    A-G Ratio 1.3 g/dL   TROPONIN   Result Value Ref Range    Troponin I 0.05 (H) 0.00 - 0.04 ng/mL   CKMB   Result Value Ref Range    CK-Mb 8.9 (H) 0.0 - 5.0 ng/mL   ESTIMATED GFR   Result Value Ref Range    GFR If  38 (A) >60 mL/min/1.73 m 2    GFR If Non  32 (A) >60 mL/min/1.73 m 2   EKG (ER)   Result Value Ref Range    Report       [facility] Emergency Dept.    Test Date:  2018  Pt Name:    MARKEL BURTON                  Department: ER  MRN:        [id number]                      Room:       RD 12  Gender:     Male                         Technician: [id number]  :        1960                   Requested By:ARTHUR VALENCIA  Order #:    [id number]                    Reading MD:    Measurements  Intervals                                Axis  Rate:       80                           P:          45  KS:         172                          QRS:        18  QRSD:       98                           T:          34  QT:         388  QTc:        448    Interpretive Statements  SINUS RHYTHM  EARLY PRECORDIAL R/S TRANSITION  BORDERLINE INFERIOR Q WAVES  No previous ECG available for comparison        All labs were  reviewed by me.    EKG  12 Lead EKG at 1927 interpreted by me to show:  Sinus rhythm  Rate 80  Axis: Normal  Intervals: Normal  No appreciable acute ST changes  No old EKG for comparison.       RADIOLOGY  DX-CHEST-2 VIEWS   Final Result         1.  No acute cardiopulmonary disease.   2.  6.5 mm left midlung nodular density, recommend follow-up chest x-ray in 3 months for evaluation of stability.   3.  Hyperexpansion of lungs favors changes of COPD.        The radiologist's interpretation of all radiological studies and images have been reviewed by me.    COURSE & MEDICAL DECISION MAKING  Pertinent Labs & Imaging studies reviewed. (See chart for details)        7:10 PM - Patient seen and examined at bedside. Discussed plan of care which includes xray evaluation and labs. He understands and agrees to plan. Ordered DX chest, CMP, troponin, CKMB, CBC, estimated GFR to evaluate his symptoms.     10:06 PM Patient reevaluated at bedside. Discussed lab and imaging results as seen above which includes elevated elevated white count and elevated troponin. Due to abnormalities, I recommended staying in the hospital for further evaluation and care.     The patient is leaving against medical advice.  I discussed with the patient the risks of leaving without receiving appropriate care to include permanent disability or death.  I have discussed possible alternatives.  The patient is not intoxicated.  The patient is a capable decision maker and understands the risks and benefits of their decision.  While I certainly disagree with the patient's decision, I respect the patient's autonomy and will not keep them here against their will.  They understand that their decision to leave can be reversed at any time and they can return to us at any time for any reason at all.     Decision Making:  This is a 58 y.o. year old male who presents with period of left leg weakness and paresthesias. Patient believes is secondary to over work and  dehydration and heat exhaustion. Patient initially declining CT of the head as I did discuss possibility of TIA and stroke. 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 Patient is somewhat anxious to leave and declining majority of the workup although agreeable to initial hematology. There is evidence of elevated creatinine as well as highly elevated troponin at this time. While the patient does not have any neurologic deficits and his muscles felt exam is benign I have had a long conversation at bedside regarding the additional lab abnormalities that are currently abnormal primarily that of the troponin and creatinine. Strongly encouraged to patient to allow for further ER evaluation which could include d-dimer, CT A, DVT ultrasound initially and likely ongoing inpatient ACS and TIA rule out given his presentation. The patient declines this evaluation and wishes to sign out against medical advice as he wishes to drive back to [place] where he primarily resides so he can have his local primary care physician work this up. The patient has signed out against medical advice with the understanding that he may have a reoccurrence of his symptoms and he may have injury or event that may lead to long-term disability and/or death.      FINAL IMPRESSION  1. Left leg paresthesias    2. Elevated troponin    3. Elevated serum creatinine          IDilshad (Scribe), am scribing for, and in the presence of, Roque Strauss M.D..    Electronically signed by: Dilshad Burrows (Scribe), 7/9/2018    IRoque M.D. personally performed the services described in this documentation, as scribed by Dilshad Burrows in my presence, and it is both accurate and complete.    The note accurately reflects work and decisions made by me.  Roque Strauss  7/10/2018  3:15 AM

## 2018-07-10 NOTE — ED NOTES
Patient requesting to shower, advised patient we were awaiting ERP. Patient given wash cloths to clean hands/face. Waiting ERP assessment and evaluation.